# Patient Record
Sex: MALE | Race: WHITE | NOT HISPANIC OR LATINO | Employment: FULL TIME | URBAN - METROPOLITAN AREA
[De-identification: names, ages, dates, MRNs, and addresses within clinical notes are randomized per-mention and may not be internally consistent; named-entity substitution may affect disease eponyms.]

---

## 2023-01-02 ENCOUNTER — MEDICAL CORRESPONDENCE (OUTPATIENT)
Dept: HEALTH INFORMATION MANAGEMENT | Facility: CLINIC | Age: 18
End: 2023-01-02

## 2023-01-03 ENCOUNTER — TRANSCRIBE ORDERS (OUTPATIENT)
Dept: OTHER | Age: 18
End: 2023-01-03

## 2023-01-03 DIAGNOSIS — F64.9 GENDER DYSPHORIA: Primary | ICD-10-CM

## 2023-01-03 DIAGNOSIS — F90.2 ATTENTION DEFICIT HYPERACTIVITY DISORDER, COMBINED TYPE: ICD-10-CM

## 2023-01-03 DIAGNOSIS — F31.81 BIPOLAR II DISORDER (H): ICD-10-CM

## 2023-01-09 ENCOUNTER — TELEPHONE (OUTPATIENT)
Dept: PLASTIC SURGERY | Facility: CLINIC | Age: 18
End: 2023-01-09

## 2023-01-09 NOTE — CONFIDENTIAL NOTE
[facility] :  Care Coordination Note     SITUATION   Mira Preciado (they/them) is a 18 year old adult who is receiving support for:  Care Team  .    BACKGROUND     Pt is scheduled for a vaginoplasty consult with Dr. Bhat on 11/14/2023. They are considering full depth, but still unsure. Writer discussed LOS and hair removal.     ASSESSMENT     Surgery              CGC Assessment  [facility] ([facility] [place]) Enrollment: Enrolled  Patient has a therapist: Yes  Letter of support #1: Requested  Letter of support #2: Requested  Surgery being considered: Yes  Vaginoplasty: Yes    Pt reports:   No nicotine  HRT 1 year  No other gender affirming surgeries      PLAN          Nursing Interventions:       Follow-up plan:  1. Obtain LOS  2. Start hair removal, if desired. Writer to send hair removal info for [place] area [facility]        Nat Miller

## 2023-02-05 ENCOUNTER — HEALTH MAINTENANCE LETTER (OUTPATIENT)
Age: 18
End: 2023-02-05

## 2023-03-23 ENCOUNTER — TELEPHONE (OUTPATIENT)
Dept: PLASTIC SURGERY | Facility: CLINIC | Age: 18
End: 2023-03-23
Payer: COMMERCIAL

## 2023-03-23 NOTE — CONFIDENTIAL NOTE
Writer YULIYA re: earlier consult for vaginoplasty available with Rita Hernandez. Also sent [employer] message.

## 2023-03-31 ENCOUNTER — TELEPHONE (OUTPATIENT)
Dept: PLASTIC SURGERY | Facility: CLINIC | Age: 18
End: 2023-03-31
Payer: COMMERCIAL

## 2023-03-31 NOTE — CONFIDENTIAL NOTE
Pt called re: earlier appt with Rita Hernandez available for vaginoplasty consult. [facility]igo rescheduled for 6/9/23.

## 2023-04-03 NOTE — TELEPHONE ENCOUNTER
FUTURE VISIT INFORMATION      FUTURE VISIT INFORMATION:    Date: 6/9/2023    Time: 10 AM    Location: [facility]  REFERRAL INFORMATION:    Referring provider: Dr. Dixie Delcid    Referring providers clinic: [facility]    Reason for visit/diagnosis: Vaginoplasty    RECORDS REQUESTED FROM:       Clinic name Comments Records Status Imaging Status   [facility] 1/2/23 - PSYCH OV with Dr. Dixie Delcid Received

## 2023-06-09 ENCOUNTER — PRE VISIT (OUTPATIENT)
Dept: PLASTIC SURGERY | Facility: CLINIC | Age: 18
End: 2023-06-09

## 2023-06-09 ENCOUNTER — TELEPHONE (OUTPATIENT)
Dept: PLASTIC SURGERY | Facility: CLINIC | Age: 18
End: 2023-06-09

## 2023-06-09 ENCOUNTER — VIRTUAL VISIT (OUTPATIENT)
Dept: PLASTIC SURGERY | Facility: CLINIC | Age: 18
End: 2023-06-09
Payer: COMMERCIAL

## 2023-06-09 VITALS — HEIGHT: 73 IN | WEIGHT: 195 LBS | BODY MASS INDEX: 25.84 KG/M2

## 2023-06-09 DIAGNOSIS — F90.2 ATTENTION DEFICIT HYPERACTIVITY DISORDER, COMBINED TYPE: ICD-10-CM

## 2023-06-09 DIAGNOSIS — F31.81 BIPOLAR II DISORDER (H): ICD-10-CM

## 2023-06-09 DIAGNOSIS — F64.9 GENDER DYSPHORIA: ICD-10-CM

## 2023-06-09 PROCEDURE — 99205 OFFICE O/P NEW HI 60 MIN: CPT | Mod: VID | Performed by: NURSE PRACTITIONER

## 2023-06-09 RX ORDER — MULTIPLE VITAMINS W/ MINERALS TAB 9MG-400MCG
1 TAB ORAL DAILY
COMMUNITY

## 2023-06-09 RX ORDER — ESTERIFIED ESTROGEN AND METHYLTESTOSTERONE .625; 1.25 MG/1; MG/1
1 TABLET ORAL DAILY
COMMUNITY

## 2023-06-09 RX ORDER — PROGESTERONE 200 MG/1
200 CAPSULE ORAL DAILY
COMMUNITY

## 2023-06-09 ASSESSMENT — PAIN SCALES - GENERAL: PAINLEVEL: NO PAIN (0)

## 2023-06-09 NOTE — LETTER
"6/9/2023       RE: Levi Preciado  [address]  [place] [address]     Dear Colleague,    Thank you for referring your patient, Levi Preciado, to the [facility] [place] at [facility]. Please see a copy of my visit note below.    Mira is a 18 year old who is being evaluated via a billable video visit.      How would you like to obtain your AVS? MyChart  If the video visit is dropped, the invitation should be resent by: Send to e-mail at: [email or web address]  Will anyone else be joining your video visit? No        Video-Visit Details    Type of service:  Video Visit     Originating Location (pt. Location): Home    Distant Location (provider location):  Off-site  Platform used for Video Visit: [employer]   Video start: 9:58am  Video end 11:08 am        Name: Levi Preciado \"Mira\"    MRN: [id number]   YOB: 2005    Patient reports name has been legally changed and they just received insurance card, but are waiting for new ID card. They will sedn this documentation as soon as they have it so they can have their Epic chart updated.               Chief Complaint:   Gender Dysphoria            History of Present Illness:   Mira is a 18 year old transgender female seen in consultation for gender dysphoria    Patient transitioned starting in 2020  Preferred pronouns are: they/them/theirs and she/her/hers  The patient has been on exogenous hormones since: January 2022. (Estrogen and progesterone)  In terms of an intimate relationship, the patient has a partner who uses she/they pronouns. Partner lives in [place] but lives in [place] during summer. Mira's parents also live close in [place].  In terms of fertility, the patient: has already completed sperm banking    (New)  The patient has providers who can write letters.     (Prior Surgery)  The patient has previously undergone no gender surgeries. " "    Long-term surgical goals for the patient include: full depth vaginoplasty. Also interested in eventual breast augmentation if chest growth plateaus. Also hoping for tracheal shave in the future.      The patient is here today expressing interest in full depth vaginoplasty.    The patient has not begun hair removal. They are looking into options near them. She will discuss with Susan Doss about options at [place].         Past Medical History:   No past medical history on file.   Bipolar disorder - sees a therapist and psychiatrist regularly   ADHD- - on Vyvanse         Past Surgical History:   No past surgical history on file.   Circumcision as young child  [place] tooth removal         Social History:     Social History     Tobacco Use    Smoking status: Never    Smokeless tobacco: Never   Vaping Use    Vaping status: Not on file   Substance Use Topics    Alcohol use: Not on file            Family History:   No family history on file.           Allergies:     Allergies   Allergen Reactions    Codeine Hives and Shortness Of Breath            Medications:     Current Outpatient Medications   Medication Sig    estrogens-methylTESTOSTERone (ESTRATEST HS) 0.625-1.25 MG per tablet Take 1 tablet by mouth daily    multivitamin w/minerals (THERA-VIT-M) tablet Take 1 tablet by mouth daily    progesterone (PROMETRIUM) 200 MG capsule Take 200 mg by mouth daily     No current facility-administered medications for this visit.             Review of Systems:    ROS: ROS neg other than the symptoms noted above in the HPI.          Physical Exam:   Ht 1.854 m (6' 1\")   Wt 88.5 kg (195 lb)   BMI 25.73 kg/m    General: age-appropriate in NAD  HEENT: Head AT/NC, EOMI, CN Grossly intact  Resp: no respiratory distress  :  exam deferred  Neuro: grossly intact            Assessment and Plan:   18 year old transgender female with gender dysphoria    The criteria for genital surgery are specific to the type of surgery being " requested.  Criteria for bottom surgery:    1. Persistent, well documented gender dysphoria;  2. Capacity to make a fully informed decision and to consent for treatment;  3. Age of consent (>18 years old)  4. If significant medical or mental health concerns are present, they must be well controlled.  5. 12 continuous months of hormone therapy as appropriate to the patient s gender goals (unless  the patient has a medical contraindication or is otherwise unable or unwilling to take  Hormones).  6. Two letters of support    The aim of hormone therapy prior to gonadectomy is primarily to introduce a period of reversible  estrogen or testosterone suppression, before the patient undergoes irreversible surgical intervention.    I reviewed the steps of orchiectomy. I reviewed the surgical procedure. I reviewed the risks and benefits including bleeding, infection and irreversible nature of the procedure. The patient would like orchiectomy as part of vaginoplasty.    Hair removal is a requirement prior to full depth vaginoplasty as the genital skin will be placed in the vaginal cavity. Lack of hair removal would lead to complications related to intravaginal hair. This is nearly impossible to remove postoperatively.    She has a persistent, well documented gender dysphoria. She has capacity to make a fully informed decision and to consent for treatment. Her mental health issues are well controlled. She has been on continuous hormones for years. She has providers who will write letters of support.     The patient meets all of these criteria. We discussed that gender affirmation surgery should be considered permanent. We discussed risks/complications of rectal injury, rectovaginal fistula, bleeding, fluid collection, infection, injury to surrounding structures, flap loss, sensory loss, wound dehiscence, vaginal prolapse, vaginal shrinkage/stenosis, need for lifelong dilation, urinary stream abnormalities, DVT/PE and need for  revision surgery.     We discussed the option for minimal depth and full depth. She would like full depth vaginoplasty, possible use of peritoneal tissue     We also discussed the need to stop hormones alpesh-procedurally for 1 week before and after surgery.     We discussed that transgender vaginoplasty for this patient would include: penectomy, orchiectomy, clitoroplasty, labiaplasty, urethral reconstruction, creation of a vagina, skin graft, colpopexy to suspend the vagina, and scrotectomy.       Needs to start hair removal  Not ready for prior auth      Plan: Patient will work on getting started with hair removal. She will reach out when done with hair removal to schedule hair check. She will upload LOS when she obtains them.    F/U: Patient to contact us when done with hair removal to schedule hair check appointment with VANESSA Schneider, CNP  [facility]    75 minutes spent on day of encounter doing chart review, history and exam, consultation and education, and additional activities as including in note above.

## 2023-06-09 NOTE — PROGRESS NOTES
"Mira is a 18 year old who is being evaluated via a billable video visit.      How would you like to obtain your AVS? MyChart  If the video visit is dropped, the invitation should be resent by: Send to e-mail at: [email or web address]  Will anyone else be joining your video visit? No        Video-Visit Details    Type of service:  Video Visit     Originating Location (pt. Location): Home    Distant Location (provider location):  Off-site  Platform used for Video Visit: [employer]   Video start: 9:58am  Video end 11:08 am        Name: Levi Preciado \"Mira\"    MRN: [id number]   YOB: 2005    Patient reports name has been legally changed and they just received insurance card, but are waiting for new ID card. They will sedn this documentation as soon as they have it so they can have their Epic chart updated.               Chief Complaint:   Gender Dysphoria            History of Present Illness:   Mira is a 18 year old transgender female seen in consultation for gender dysphoria    Patient transitioned starting in 2020  Preferred pronouns are: they/them/theirs and she/her/hers  The patient has been on exogenous hormones since: January 2022. (Estrogen and progesterone)  In terms of an intimate relationship, the patient has a partner who uses she/they pronouns. Partner lives in [place] but lives in [place] during summer. Mira's parents also live close in [place].  In terms of fertility, the patient: has already completed sperm banking    (New)  The patient has providers who can write letters.     (Prior Surgery)  The patient has previously undergone no gender surgeries.     Long-term surgical goals for the patient include: full depth vaginoplasty. Also interested in eventual breast augmentation if chest growth plateaus. Also hoping for tracheal shave in the future.      The patient is here today expressing interest in full depth vaginoplasty.    The patient has not begun hair removal. They are " "looking into options near them. She will discuss with Susan Doss about options at [place].         Past Medical History:   No past medical history on file.   Bipolar disorder - sees a therapist and psychiatrist regularly   ADHD- - on Vyvanse         Past Surgical History:   No past surgical history on file.   Circumcision as young child  [place] tooth removal         Social History:     Social History     Tobacco Use     Smoking status: Never     Smokeless tobacco: Never   Vaping Use     Vaping status: Not on file   Substance Use Topics     Alcohol use: Not on file            Family History:   No family history on file.           Allergies:     Allergies   Allergen Reactions     Codeine Hives and Shortness Of Breath            Medications:     Current Outpatient Medications   Medication Sig     estrogens-methylTESTOSTERone (ESTRATEST HS) 0.625-1.25 MG per tablet Take 1 tablet by mouth daily     multivitamin w/minerals (THERA-VIT-M) tablet Take 1 tablet by mouth daily     progesterone (PROMETRIUM) 200 MG capsule Take 200 mg by mouth daily     No current facility-administered medications for this visit.             Review of Systems:    ROS: ROS neg other than the symptoms noted above in the HPI.          Physical Exam:   Ht 1.854 m (6' 1\")   Wt 88.5 kg (195 lb)   BMI 25.73 kg/m    General: age-appropriate in NAD  HEENT: Head AT/NC, EOMI, CN Grossly intact  Resp: no respiratory distress  :  exam deferred  Neuro: grossly intact            Assessment and Plan:   18 year old transgender female with gender dysphoria    The criteria for genital surgery are specific to the type of surgery being requested.  Criteria for bottom surgery:    1. Persistent, well documented gender dysphoria;  2. Capacity to make a fully informed decision and to consent for treatment;  3. Age of consent (>18 years old)  4. If significant medical or mental health concerns are present, they must be well controlled.  5. 12 continuous months of " hormone therapy as appropriate to the patient s gender goals (unless  the patient has a medical contraindication or is otherwise unable or unwilling to take  Hormones).  6. Two letters of support    The aim of hormone therapy prior to gonadectomy is primarily to introduce a period of reversible  estrogen or testosterone suppression, before the patient undergoes irreversible surgical intervention.    I reviewed the steps of orchiectomy. I reviewed the surgical procedure. I reviewed the risks and benefits including bleeding, infection and irreversible nature of the procedure. The patient would like orchiectomy as part of vaginoplasty.    Hair removal is a requirement prior to full depth vaginoplasty as the genital skin will be placed in the vaginal cavity. Lack of hair removal would lead to complications related to intravaginal hair. This is nearly impossible to remove postoperatively.    She has a persistent, well documented gender dysphoria. She has capacity to make a fully informed decision and to consent for treatment. Her mental health issues are well controlled. She has been on continuous hormones for years. She has providers who will write letters of support.     The patient meets all of these criteria. We discussed that gender affirmation surgery should be considered permanent. We discussed risks/complications of rectal injury, rectovaginal fistula, bleeding, fluid collection, infection, injury to surrounding structures, flap loss, sensory loss, wound dehiscence, vaginal prolapse, vaginal shrinkage/stenosis, need for lifelong dilation, urinary stream abnormalities, DVT/PE and need for revision surgery.     We discussed the option for minimal depth and full depth. She would like full depth vaginoplasty, possible use of peritoneal tissue     We also discussed the need to stop hormones alpesh-procedurally for 1 week before and after surgery.     We discussed that transgender vaginoplasty for this patient would  include: penectomy, orchiectomy, clitoroplasty, labiaplasty, urethral reconstruction, creation of a vagina, skin graft, colpopexy to suspend the vagina, and scrotectomy.       Needs to start hair removal  Not ready for prior auth      Plan: Patient will work on getting started with hair removal. She will reach out when done with hair removal to schedule hair check. She will upload LOS when she obtains them.    F/U: Patient to contact us when done with hair removal to schedule hair check appointment with VANESSA Schneider, CNP  [facility] [place]    75 minutes spent on day of encounter doing chart review, history and exam, consultation and education, and additional activities as including in note above.

## 2023-06-09 NOTE — TELEPHONE ENCOUNTER
LVM to notify pt that appt today 6/9 at 10 with Rita needs to be switched to virtual visit. Left call back number asked pt to confirm this or if they'd like to reschedule. Sent [employer].

## 2023-06-09 NOTE — PATIENT INSTRUCTIONS
It was wonderful to meet you today Indigo!  Please reach out with any questions or concerns as you work on starting hair removal.  Our comprehensive gender care team is here to support you throughout this entire process.  Our RN will send you information on the ENT provider who does tracheal shave when they return next week.  When you have all of your information together regarding your legal name change, please send us this documentation so we can update your chart.

## 2023-06-09 NOTE — NURSING NOTE
"Chief Complaint   Patient presents with     New Patient     Vaginoplasty       Vitals:    06/09/23 0840   Weight: 195 lb   Height: 6' 1\"       Body mass index is 25.73 kg/m .    Katya Castro CMA    "

## 2023-06-15 DIAGNOSIS — F64.9 GENDER DYSPHORIA: Primary | ICD-10-CM

## 2023-08-07 NOTE — TELEPHONE ENCOUNTER
FUTURE VISIT INFORMATION      FUTURE VISIT INFORMATION:  Date: 11/30/23  Time: 11am  Location: [facility] [place]  REFERRAL INFORMATION:  Referring provider:    Referring providers clinic:    Reason for visit/diagnosis  Tracheal shave consult     RECORDS REQUESTED FROM:       Clinic name Comments Records Status Imaging Status   [facility]  6/9/23- OV with Rita Hernandez APRN CNP  [facility]  9/26/19- XR Chest     Office Visit:  5/24/23- OV with Torsten Toney APRN-CNP    8/11/20- OV with Cristal Pride MD    9/26/19, 9/24/19, 9/22/19 - OV with Suyapa Wilde PA   Care everywhere  Pending Req     Req 10/4/23  PACS     October 4, 2023 at 8:19 AM - request for images -Sarahi  October 11, 2023 at 2:19 PM - XR chest image resolved in pacs -Sarahi

## 2023-11-30 ENCOUNTER — PRE VISIT (OUTPATIENT)
Dept: OTOLARYNGOLOGY | Facility: CLINIC | Age: 18
End: 2023-11-30

## 2024-03-03 ENCOUNTER — HEALTH MAINTENANCE LETTER (OUTPATIENT)
Age: 19
End: 2024-03-03

## 2025-03-15 ENCOUNTER — HEALTH MAINTENANCE LETTER (OUTPATIENT)
Age: 20
End: 2025-03-15

## 2025-08-28 ENCOUNTER — TELEPHONE (OUTPATIENT)
Dept: PLASTIC SURGERY | Facility: CLINIC | Age: 20
End: 2025-08-28
Payer: COMMERCIAL